# Patient Record
Sex: FEMALE | Race: WHITE
[De-identification: names, ages, dates, MRNs, and addresses within clinical notes are randomized per-mention and may not be internally consistent; named-entity substitution may affect disease eponyms.]

---

## 2019-06-12 ENCOUNTER — HOSPITAL ENCOUNTER (EMERGENCY)
Dept: HOSPITAL 53 - M ED | Age: 34
Discharge: HOME | End: 2019-06-12
Payer: COMMERCIAL

## 2019-06-12 VITALS — DIASTOLIC BLOOD PRESSURE: 55 MMHG | SYSTOLIC BLOOD PRESSURE: 112 MMHG

## 2019-06-12 VITALS — WEIGHT: 210.98 LBS | HEIGHT: 62 IN | BODY MASS INDEX: 38.83 KG/M2

## 2019-06-12 DIAGNOSIS — F32.9: ICD-10-CM

## 2019-06-12 DIAGNOSIS — K92.9: ICD-10-CM

## 2019-06-12 DIAGNOSIS — F41.9: ICD-10-CM

## 2019-06-12 DIAGNOSIS — D75.9: ICD-10-CM

## 2019-06-12 DIAGNOSIS — Z79.899: ICD-10-CM

## 2019-06-12 DIAGNOSIS — N83.292: Primary | ICD-10-CM

## 2019-06-12 LAB
ALBUMIN SERPL BCG-MCNC: 3.3 GM/DL (ref 3.2–5.2)
ALT SERPL W P-5'-P-CCNC: 23 U/L (ref 12–78)
AMYLASE SERPL-CCNC: 50 U/L (ref 25–115)
BASOPHILS # BLD AUTO: 0 10^3/UL (ref 0–0.2)
BASOPHILS NFR BLD AUTO: 0.4 % (ref 0–1)
BILIRUB CONJ SERPL-MCNC: < 0.1 MG/DL (ref 0–0.2)
BILIRUB SERPL-MCNC: 0.3 MG/DL (ref 0.2–1)
BUN SERPL-MCNC: 6 MG/DL (ref 7–18)
CALCIUM SERPL-MCNC: 8.9 MG/DL (ref 8.5–10.1)
CHLORIDE SERPL-SCNC: 105 MEQ/L (ref 98–107)
CO2 SERPL-SCNC: 28 MEQ/L (ref 21–32)
CREAT SERPL-MCNC: 0.81 MG/DL (ref 0.55–1.3)
EOSINOPHIL # BLD AUTO: 0.1 10^3/UL (ref 0–0.5)
EOSINOPHIL NFR BLD AUTO: 1 % (ref 0–3)
GFR SERPL CREATININE-BSD FRML MDRD: > 60 ML/MIN/{1.73_M2} (ref 60–?)
GLUCOSE SERPL-MCNC: 90 MG/DL (ref 70–100)
HCT VFR BLD AUTO: 40.7 % (ref 36–47)
HGB BLD-MCNC: 12.8 G/DL (ref 12–15.5)
LIPASE SERPL-CCNC: 125 U/L (ref 73–393)
LYMPHOCYTES # BLD AUTO: 1.6 10^3/UL (ref 1.5–4.5)
LYMPHOCYTES NFR BLD AUTO: 20.1 % (ref 24–44)
MCH RBC QN AUTO: 26.9 PG (ref 27–33)
MCHC RBC AUTO-ENTMCNC: 31.4 G/DL (ref 32–36.5)
MCV RBC AUTO: 85.5 FL (ref 80–96)
MONOCYTES # BLD AUTO: 0.3 10^3/UL (ref 0–0.8)
MONOCYTES NFR BLD AUTO: 4 % (ref 0–5)
NEUTROPHILS # BLD AUTO: 6 10^3/UL (ref 1.8–7.7)
NEUTROPHILS NFR BLD AUTO: 74.1 % (ref 36–66)
PLATELET # BLD AUTO: 203 10^3/UL (ref 150–450)
POTASSIUM SERPL-SCNC: 3.9 MEQ/L (ref 3.5–5.1)
PROT SERPL-MCNC: 7.1 GM/DL (ref 6.4–8.2)
RBC # BLD AUTO: 4.76 10^6/UL (ref 4–5.4)
SODIUM SERPL-SCNC: 139 MEQ/L (ref 136–145)
WBC # BLD AUTO: 8.1 10^3/UL (ref 4–10)

## 2019-06-12 PROCEDURE — 93976 VASCULAR STUDY: CPT

## 2019-06-12 PROCEDURE — 80048 BASIC METABOLIC PNL TOTAL CA: CPT

## 2019-06-12 PROCEDURE — 99284 EMERGENCY DEPT VISIT MOD MDM: CPT

## 2019-06-12 PROCEDURE — 84702 CHORIONIC GONADOTROPIN TEST: CPT

## 2019-06-12 PROCEDURE — 83690 ASSAY OF LIPASE: CPT

## 2019-06-12 PROCEDURE — 80076 HEPATIC FUNCTION PANEL: CPT

## 2019-06-12 PROCEDURE — 96374 THER/PROPH/DIAG INJ IV PUSH: CPT

## 2019-06-12 PROCEDURE — 82150 ASSAY OF AMYLASE: CPT

## 2019-06-12 PROCEDURE — 96376 TX/PRO/DX INJ SAME DRUG ADON: CPT

## 2019-06-12 PROCEDURE — 96375 TX/PRO/DX INJ NEW DRUG ADDON: CPT

## 2019-06-12 PROCEDURE — 76830 TRANSVAGINAL US NON-OB: CPT

## 2019-06-12 PROCEDURE — 76856 US EXAM PELVIC COMPLETE: CPT

## 2019-06-12 PROCEDURE — 83605 ASSAY OF LACTIC ACID: CPT

## 2019-06-12 PROCEDURE — 74177 CT ABD & PELVIS W/CONTRAST: CPT

## 2019-06-12 PROCEDURE — 96361 HYDRATE IV INFUSION ADD-ON: CPT

## 2019-06-12 PROCEDURE — 85025 COMPLETE CBC W/AUTO DIFF WBC: CPT

## 2019-06-12 NOTE — REP
CT ABDOMEN AND PELVIS WITH IV CONTRAST:

 

TECHNIQUE:  Axial contrast enhanced images from the lung bases to the pubic

symphysis using 100 mL Isovue 370 intravenous contrast material with multiplanar

reformations.

 

Visualized lung bases are clear.  The liver demonstrates no mass.  The patient

has had a prior cholecystectomy.  There is expected prominence of the common bile

duct.  The spleen, adrenals, pancreas, and kidneys are unremarkable.  There is no

hydronephrosis.  There is no abdominal aortic aneurysm.  There is no abdominal

aortic aneurysm.  There is no adenopathy.  There is no free air.  There is

diastasis of the rectus muscles.  There is no evidence of appendicitis or bowel

inflammation.  In the pelvis there is a complex cyst again noted of the left

ovary, maximum diameter is approximately 5 cm in.  There is a small amount of

free fluid in the pelvis.  Urinary bladder is mildly distended and grossly

unremarkable.

 

IMPRESSION:

 

Complex cyst left ovary with a maximum diameter of 5 cm.  Small amount of

adjacent free fluid.  No evidence of appendicitis.  No free air.  Status post

cholecystectomy.

 

 

Electronically Signed by

Agus Hammer MD 06/13/2019 04:13 P

## 2019-06-12 NOTE — REP
PELVIC ULTRASOUND:

 

Real-time sonographic evaluation of pelvis performed utilizing transabdominal and

endovaginal technique.

 

The bladder measures 4.1 x 2.7 x 4.4 cm. Uterus measures 8.4 x 3.4 x 4.7 cm.

Endometrial thickness is 9 mm.  Right ovary measures 3.4 x 1.9 x 2.0 cm and

contains a dominant follicle, 1.5 cm in diameter. Left ovary measures 4.8 x 3.7 x

4.3 cm and contains a complex cyst with thin and internal septations and

low-level echoes, measuring 4.5 x 2.9 x 3.6 cm. There is no torsion bilaterally,

resistive index right ovary 0.59 and left ovary 0.62. There is mild free fluid.

 

IMPRESSION:

 

Dominant follicle right ovary 1.5 cm in diameter. Complex septated cyst left

ovary 4.5 cm in maximum diameter. Small amount of free fluid. No torsion.

Recommend followup ultrasound in 2 months.

 

 

Electronically Signed by

Agus Hammer MD 06/12/2019 04:57 P

## 2019-06-14 NOTE — ED PDOC
Post-Departure Follow-Up


dr donohue and dr ly faxed formal report of pelvic us for fu mlg Lundborg-Gray,Maja MD          Jun 14, 2019 07:40

## 2019-06-17 NOTE — ED PDOC
Post-Departure Follow-Up


dr ly and dr donohue faxed formal report of ct abd/p for fu mlg Lundborg-Gray,Maja MD          Jun 17, 2019 12:51

## 2019-11-15 ENCOUNTER — HOSPITAL ENCOUNTER (OUTPATIENT)
Dept: HOSPITAL 53 - M LRY | Age: 34
End: 2019-11-15
Attending: NURSE PRACTITIONER
Payer: COMMERCIAL

## 2019-11-15 ENCOUNTER — HOSPITAL ENCOUNTER (OUTPATIENT)
Dept: HOSPITAL 53 - M SFHCLERA | Age: 34
End: 2019-11-15
Attending: NURSE PRACTITIONER
Payer: COMMERCIAL

## 2019-11-15 DIAGNOSIS — J02.9: Primary | ICD-10-CM

## 2019-11-15 DIAGNOSIS — R05: Primary | ICD-10-CM

## 2019-11-15 PROCEDURE — 87880 STREP A ASSAY W/OPTIC: CPT

## 2019-11-15 PROCEDURE — 71046 X-RAY EXAM CHEST 2 VIEWS: CPT

## 2019-11-15 NOTE — REP
Chest x-ray:  Two views.

 

History: Cough .

 

Comparison study: February 19, 2015 .

 

Findings:  The lungs are well inflated and free of infiltrate.  The pleural

angles are sharp.  The heart size is normal.  Pulmonary vasculature is not

increased.  No significant bony abnormality is seen.

 

Impression:

 

Negative chest x-ray.

 

 

Electronically Signed by

Demetrius Rowe MD 11/15/2019 12:25 P